# Patient Record
Sex: MALE | URBAN - METROPOLITAN AREA
[De-identification: names, ages, dates, MRNs, and addresses within clinical notes are randomized per-mention and may not be internally consistent; named-entity substitution may affect disease eponyms.]

---

## 2022-10-19 ENCOUNTER — TELEPHONE (OUTPATIENT)
Dept: UROLOGY | Facility: CLINIC | Age: 57
End: 2022-10-19
Payer: MEDICARE

## 2022-10-25 DIAGNOSIS — N52.9 ERECTILE DYSFUNCTION: Primary | ICD-10-CM

## 2022-12-15 ENCOUNTER — OFFICE VISIT (OUTPATIENT)
Dept: UROLOGY | Facility: CLINIC | Age: 57
End: 2022-12-15
Payer: COMMERCIAL

## 2022-12-15 DIAGNOSIS — N52.9 ERECTILE DYSFUNCTION: ICD-10-CM

## 2022-12-15 DIAGNOSIS — F17.200 NEEDS SMOKING CESSATION EDUCATION: ICD-10-CM

## 2022-12-15 DIAGNOSIS — R79.89 HIGH SERUM TESTOSTERONE: Primary | ICD-10-CM

## 2022-12-15 PROCEDURE — 1159F MED LIST DOCD IN RCRD: CPT | Mod: CPTII,S$GLB,, | Performed by: NURSE PRACTITIONER

## 2022-12-15 PROCEDURE — 99204 OFFICE O/P NEW MOD 45 MIN: CPT | Mod: S$GLB,,, | Performed by: NURSE PRACTITIONER

## 2022-12-15 PROCEDURE — 1159F PR MEDICATION LIST DOCUMENTED IN MEDICAL RECORD: ICD-10-PCS | Mod: CPTII,S$GLB,, | Performed by: NURSE PRACTITIONER

## 2022-12-15 PROCEDURE — 1160F PR REVIEW ALL MEDS BY PRESCRIBER/CLIN PHARMACIST DOCUMENTED: ICD-10-PCS | Mod: CPTII,S$GLB,, | Performed by: NURSE PRACTITIONER

## 2022-12-15 PROCEDURE — 1160F RVW MEDS BY RX/DR IN RCRD: CPT | Mod: CPTII,S$GLB,, | Performed by: NURSE PRACTITIONER

## 2022-12-15 PROCEDURE — 99204 PR OFFICE/OUTPT VISIT, NEW, LEVL IV, 45-59 MIN: ICD-10-PCS | Mod: S$GLB,,, | Performed by: NURSE PRACTITIONER

## 2022-12-15 RX ORDER — METOPROLOL TARTRATE 50 MG/1
50 TABLET ORAL 2 TIMES DAILY
COMMUNITY
Start: 2022-10-25

## 2022-12-15 RX ORDER — BUSPIRONE HYDROCHLORIDE 15 MG/1
TABLET ORAL
COMMUNITY
Start: 2022-12-09

## 2022-12-15 RX ORDER — RISPERIDONE 0.5 MG/1
0.5 TABLET ORAL NIGHTLY
COMMUNITY
Start: 2022-10-12

## 2022-12-15 RX ORDER — ARIPIPRAZOLE 2 MG/1
2 TABLET ORAL NIGHTLY
COMMUNITY
Start: 2022-10-12

## 2022-12-15 RX ORDER — PROPRANOLOL HYDROCHLORIDE 40 MG/1
40 TABLET ORAL 2 TIMES DAILY
COMMUNITY
Start: 2022-10-12

## 2022-12-15 NOTE — PROGRESS NOTES
Subjective:       Patient ID: Pranav Rivera is a 57 y.o. male.    Chief Complaint: Erectile Dysfunction      HPI: 57-year-old male referred by primary care for high serum testosterone levels.  Patient is not on any supplementation either pharmaceutical greater over-the-counter.  He states his initial draw was well over 800 that was an a.m. draw his subsequent draw was back around 500 ng/dL this was a p.m. draw.  States elevated levels cause anxiety and aggression. states pituitary hormone was checked found to be within normal reference range.  He is also complaining of erectile dysfunction which has been ongoing for a couple years now has worsened of late read as not able to get an erection in the a.m. or even with stimulation.  He is PDE5 inhibitor naive.  He denies any trauma.  No other urologic concerns he is voiding without difficulty or complaint.  Patient states PSA/ALVIN is followed by primary care and up-to-date       Past Medical History:   Past Medical History:   Diagnosis Date    Anxiety disorder, unspecified     Hypertension     Male erectile dysfunction, unspecified        Past Surgical Historical: History reviewed. No pertinent surgical history.     Medications:   Medication List with Changes/Refills   Current Medications    ARIPIPRAZOLE (ABILIFY) 2 MG TAB    Take 2 mg by mouth every evening.    BUSPIRONE (BUSPAR) 15 MG TABLET        METOPROLOL TARTRATE (LOPRESSOR) 50 MG TABLET    Take 50 mg by mouth 2 (two) times daily.    PROPRANOLOL (INDERAL) 40 MG TABLET    Take 40 mg by mouth 2 (two) times daily.    RISPERIDONE (RISPERDAL) 0.5 MG TAB    Take 0.5 mg by mouth every evening.        Past Social History:   Social History     Socioeconomic History    Marital status: Unknown   Tobacco Use    Smoking status: Every Day     Types: Cigarettes    Smokeless tobacco: Current     Types: Snuff       Allergies: Review of patient's allergies indicates:  No Known Allergies     Family History:   Family History    Problem Relation Age of Onset    Colon cancer Mother         Review of Systems:  Review of Systems   Constitutional:  Negative for activity change and appetite change.   HENT:  Negative for congestion and dental problem.    Eyes:  Negative for visual disturbance.   Respiratory:  Negative for chest tightness and shortness of breath.    Cardiovascular:  Negative for chest pain.   Gastrointestinal:  Negative for abdominal distention and abdominal pain.   Genitourinary:  Negative for decreased urine volume, difficulty urinating, dysuria, enuresis, flank pain, frequency, genital sores, hematuria, penile discharge, penile pain, penile swelling, scrotal swelling, testicular pain and urgency.   Musculoskeletal:  Negative for back pain and neck pain.   Skin:  Negative for color change.   Neurological:  Negative for dizziness.   Hematological:  Negative for adenopathy.   Psychiatric/Behavioral:  Negative for agitation, behavioral problems and confusion.      Physical Exam:  Physical Exam  Constitutional:       Appearance: He is well-developed.   HENT:      Head: Normocephalic.   Eyes:      General: No scleral icterus.  Pulmonary:      Effort: Pulmonary effort is normal.      Breath sounds: Normal breath sounds.   Abdominal:      General: There is no distension.      Palpations: Abdomen is soft.      Tenderness: There is no abdominal tenderness.      Hernia: No hernia is present. There is no hernia in the right inguinal area or left inguinal area.   Genitourinary:     Penis: Normal.       Testes: Normal. Cremasteric reflex is present.   Musculoskeletal:      Cervical back: Normal range of motion.   Skin:     General: Skin is warm and dry.   Neurological:      Mental Status: He is alert and oriented to person, place, and time.       Assessment/Plan:   High serum testosterone level--see HPI above.  Order for serum testosterone/free testosterone given to patient will get this drawn at the draw station path lab in [facility]  to 10:00 a.m. in the morning    Erectile dysfunction--written script Cialis 20 mg dispense 20., 1 p.o. Q 24 H p.r.n. with refills.  Good Rx card given  Problem List Items Addressed This Visit    None  Visit Diagnoses       Erectile dysfunction

## 2022-12-16 LAB
FREE TESTOSTERONE: 12 NG/DL (ref 2–22)
TESTOST SERPL-MCNC: 727 NG/DL (ref 193–740)

## 2022-12-30 ENCOUNTER — TELEPHONE (OUTPATIENT)
Dept: UROLOGY | Facility: CLINIC | Age: 57
End: 2022-12-30

## 2022-12-30 NOTE — TELEPHONE ENCOUNTER
----- Message from Navi Fraga NP sent at 12/20/2022  6:14 PM CST -----  Please notify patient of test results.  Call patient normal lab levels. Recheck 6 weeks ,[place] before 10 a.m on day of blood draw